# Patient Record
Sex: FEMALE | Race: WHITE | Employment: FULL TIME | URBAN - METROPOLITAN AREA
[De-identification: names, ages, dates, MRNs, and addresses within clinical notes are randomized per-mention and may not be internally consistent; named-entity substitution may affect disease eponyms.]

---

## 2021-09-02 ENCOUNTER — OFFICE VISIT (OUTPATIENT)
Dept: OBGYN | Age: 61
End: 2021-09-02
Payer: COMMERCIAL

## 2021-09-02 VITALS
BODY MASS INDEX: 24.96 KG/M2 | DIASTOLIC BLOOD PRESSURE: 71 MMHG | WEIGHT: 159 LBS | SYSTOLIC BLOOD PRESSURE: 109 MMHG | HEIGHT: 67 IN

## 2021-09-02 DIAGNOSIS — N89.8 VAGINAL DRYNESS: ICD-10-CM

## 2021-09-02 DIAGNOSIS — Z13.820 ENCOUNTER FOR SCREENING FOR OSTEOPOROSIS: Primary | ICD-10-CM

## 2021-09-02 DIAGNOSIS — Z01.419 WOMEN'S ANNUAL ROUTINE GYNECOLOGICAL EXAMINATION: ICD-10-CM

## 2021-09-02 DIAGNOSIS — N32.81 OVERACTIVE BLADDER: ICD-10-CM

## 2021-09-02 PROCEDURE — 99396 PREV VISIT EST AGE 40-64: CPT | Performed by: NURSE PRACTITIONER

## 2021-09-02 RX ORDER — SOLIFENACIN SUCCINATE 5 MG/1
10 TABLET, FILM COATED ORAL DAILY
COMMUNITY
End: 2021-09-02 | Stop reason: SDUPTHER

## 2021-09-02 RX ORDER — SOLIFENACIN SUCCINATE 5 MG/1
10 TABLET, FILM COATED ORAL DAILY
Qty: 90 TABLET | Refills: 3 | Status: SHIPPED | OUTPATIENT
Start: 2021-09-02 | End: 2021-10-12

## 2021-09-02 RX ORDER — ESTRADIOL 0.1 MG/G
1 CREAM VAGINAL
Qty: 1 EACH | Refills: 11 | Status: SHIPPED | OUTPATIENT
Start: 2021-09-02

## 2021-09-02 RX ORDER — ESTRADIOL 0.1 MG/G
2 CREAM VAGINAL DAILY
COMMUNITY
End: 2021-09-02

## 2021-09-02 SDOH — ECONOMIC STABILITY: FOOD INSECURITY: WITHIN THE PAST 12 MONTHS, YOU WORRIED THAT YOUR FOOD WOULD RUN OUT BEFORE YOU GOT MONEY TO BUY MORE.: NEVER TRUE

## 2021-09-02 SDOH — ECONOMIC STABILITY: FOOD INSECURITY: WITHIN THE PAST 12 MONTHS, THE FOOD YOU BOUGHT JUST DIDN'T LAST AND YOU DIDN'T HAVE MONEY TO GET MORE.: NEVER TRUE

## 2021-09-02 ASSESSMENT — SOCIAL DETERMINANTS OF HEALTH (SDOH): HOW HARD IS IT FOR YOU TO PAY FOR THE VERY BASICS LIKE FOOD, HOUSING, MEDICAL CARE, AND HEATING?: NOT HARD AT ALL

## 2021-09-02 ASSESSMENT — PATIENT HEALTH QUESTIONNAIRE - PHQ9
1. LITTLE INTEREST OR PLEASURE IN DOING THINGS: 0
SUM OF ALL RESPONSES TO PHQ QUESTIONS 1-9: 0
SUM OF ALL RESPONSES TO PHQ QUESTIONS 1-9: 0
2. FEELING DOWN, DEPRESSED OR HOPELESS: 0
SUM OF ALL RESPONSES TO PHQ9 QUESTIONS 1 & 2: 0
SUM OF ALL RESPONSES TO PHQ QUESTIONS 1-9: 0

## 2021-09-02 NOTE — PROGRESS NOTES
9/2/21    Sophie Henao  1960    Chief Complaint   Patient presents with    Gynecologic Exam     Postmenopausal, On HRT, bioidentical cream, estrace, sexually active, Last pap smear was 2019 normal, Last mammo in 2021, normal, Last bone density was 2019 normal taking ca supp. Last colonoscopy in 2019 normal        The patient is a 64 y.o. female, Melvi Flor who presents for her annual exam.  She is menopausal.  She is taking HRT, Biodentical HRT. Mike Barcenas She is  sexually active. She reports no gynecological symptoms. Pap smear history: Her last PAP smear was in 2019. Her results were normal.    Breast history: her most recent mammogram was in 2021. The results were: Normal    Osteoporosis Status: her bone density scan was in 2019. The results were normal bone density    Colonoscopy Status: she had a colonoscopy in 2019. The results were normal.    Past Medical History:   Diagnosis Date    Bladder problem     Menopausal symptoms     Vaginal dryness        Past Surgical History:   Procedure Laterality Date    HAND SURGERY      NOSE SURGERY      RHINOPLASTY      TUBAL LIGATION      1992    WRIST SURGERY         History reviewed. No pertinent family history. Social History     Tobacco Use    Smoking status: Never Smoker    Smokeless tobacco: Never Used   Vaping Use    Vaping Use: Never used   Substance Use Topics    Alcohol use: Yes    Drug use: No       Current Outpatient Medications   Medication Sig Dispense Refill    estradiol (ESTRACE VAGINAL) 0.1 MG/GM vaginal cream Place 1 g vaginally Twice a Week 1 each 11    solifenacin (VESICARE) 5 MG tablet Take 2 tablets by mouth daily 90 tablet 3     No current facility-administered medications for this visit.        Allergies   Allergen Reactions    Demerol Hcl [Meperidine]        [id number]    Immunization History   Administered Date(s) Administered    COVID-19, Moderna, PF, 100mcg/0.5mL 03/24/2021, 04/21/2021       Review of Systems    BP 109/71   Ht 5' 7\" (1.702 m)   Wt 159 lb (72.1 kg)   BMI 24.90 kg/m²     Physical Exam    No results found for this visit on 09/02/21. Assessment and Plan   Diagnosis Orders   1. Encounter for screening for osteoporosis  DEXA BONE DENSITY AXIAL SKELETON   2. Women's annual routine gynecological examination     3. Vaginal dryness     4. Overactive bladder       Bioidentical; Estriol 2mg, Estradiol 1mg, Progesterone 100mg, Testosterone 1mg and DHEA 25mg. #40gm tube 5RF  Return in about 1 year (around 9/2/2022).     Julia Al, APRN - CNP

## 2021-09-20 ENCOUNTER — TELEPHONE (OUTPATIENT)
Dept: OBGYN | Age: 61
End: 2021-09-20

## 2021-09-20 NOTE — TELEPHONE ENCOUNTER
[facility] Take 2 tablets daily # 90 .  They need clarification 1 or 2 daily and amount

## 2021-09-21 RX ORDER — SOLIFENACIN SUCCINATE 5 MG/1
5 TABLET, FILM COATED ORAL 2 TIMES DAILY
Qty: 180 TABLET | Refills: 3 | Status: SHIPPED | OUTPATIENT
Start: 2021-09-21 | End: 2021-10-12

## 2021-09-21 NOTE — TELEPHONE ENCOUNTER
[facility] sent for vesicare 5mg bid.   10mg is maximum daily dose of vesicare (normally I would give 10mg qhs but will continue 5mg bid as this is working for the patient)

## 2021-10-11 ENCOUNTER — TELEPHONE (OUTPATIENT)
Dept: OBGYN | Age: 61
End: 2021-10-11

## 2021-10-11 DIAGNOSIS — R35.0 URINARY FREQUENCY: Primary | ICD-10-CM

## 2021-10-11 NOTE — TELEPHONE ENCOUNTER
Had annual 9/2/2021  Wants to know if she can increase her vesicare to 10 mg (instead of the 5 mg she is on currently). Pt still making frequent trips to restroom.   Needs 90 day script sent [facility]

## 2021-10-12 RX ORDER — SOLIFENACIN SUCCINATE 10 MG/1
10 TABLET, FILM COATED ORAL DAILY
Qty: 90 TABLET | Refills: 3 | Status: SHIPPED | OUTPATIENT
Start: 2021-10-12 | End: 2021-10-14 | Stop reason: SDUPTHER

## 2021-10-13 ENCOUNTER — HOSPITAL ENCOUNTER (OUTPATIENT)
Dept: MAMMOGRAPHY | Age: 61
Discharge: HOME | End: 2021-10-13
Payer: COMMERCIAL

## 2021-10-13 DIAGNOSIS — Z13.820 ENCOUNTER FOR SCREENING FOR OSTEOPOROSIS: ICD-10-CM

## 2021-10-13 PROCEDURE — 77080 DXA BONE DENSITY AXIAL: CPT

## 2021-10-14 RX ORDER — SOLIFENACIN SUCCINATE 10 MG/1
10 TABLET, FILM COATED ORAL DAILY
Qty: 30 TABLET | Refills: 0 | Status: SHIPPED | OUTPATIENT
Start: 2021-10-14 | End: 2021-10-25 | Stop reason: SDUPTHER

## 2021-10-14 NOTE — TELEPHONE ENCOUNTER
Pt states she only has 1 pill left of her Vesicare. Pt states she normally gets them through mail order and has just plaed the order for them. So they won't be here before her next dose. Pt would like to know if a 1 time refill could be sent in to her through until her rx comes.

## 2021-10-25 RX ORDER — SOLIFENACIN SUCCINATE 10 MG/1
10 TABLET, FILM COATED ORAL DAILY
Qty: 30 TABLET | Refills: 10 | Status: SHIPPED | OUTPATIENT
Start: 2021-10-25 | End: 2022-05-02 | Stop reason: SDUPTHER

## 2021-10-25 NOTE — TELEPHONE ENCOUNTER
Pt states Vesicare 10mg was never sent after DAKOTAH 9/2/21. Pt would like to know if this could be sent for the year.

## 2022-05-02 ENCOUNTER — TELEPHONE (OUTPATIENT)
Dept: OBGYN | Age: 62
End: 2022-05-02

## 2022-05-02 RX ORDER — SOLIFENACIN SUCCINATE 10 MG/1
10 TABLET, FILM COATED ORAL DAILY
Qty: 30 TABLET | Refills: 10 | Status: SHIPPED | OUTPATIENT
Start: 2022-05-02 | End: 2023-03-28

## 2022-05-02 NOTE — TELEPHONE ENCOUNTER
Pt is requesting refill of Vesicare. Pt states she received new insurance and the old prescription isn't covered. Pt is requesting 90 days. Pt's last annual was 9/2/21.